# Patient Record
(demographics unavailable — no encounter records)

---

## 2024-11-20 NOTE — REVIEW OF SYSTEMS
[SOB] : shortness of breath [Dyspnea on exertion] : dyspnea during exertion [Cough] : cough [Negative] : Heme/Lymph [Leg Claudication] : no intermittent leg claudication [de-identified] : Tingling in her fingers and toes

## 2024-11-20 NOTE — REVIEW OF SYSTEMS
[SOB] : shortness of breath [Dyspnea on exertion] : dyspnea during exertion [Cough] : cough [Negative] : Heme/Lymph [Leg Claudication] : no intermittent leg claudication [de-identified] : Tingling in her fingers and toes

## 2024-11-20 NOTE — HISTORY OF PRESENT ILLNESS
[FreeTextEntry1] : Left chest pressure lasted all day today. She awoke with it. Had a rapid heart beat prior. these symptoms are consistent with when she lays down on her left side. Had a nasal polyp issue.  ERICK, But not using her CPAP machine. Still smoking, But wants to quit. 15/0.7 9.11.24 , hdl 65 Review of her chest CT done for lung issues Did not report any coronary calcification. She has not been taking her statin for unclear reasons.  Prior: episodes of palpitations at night. Wakes her from sleep. feels suddenly and persists for minutes.  Has ERICK but not under treatment due to broken CPAP machine. Some brief chest pains. Still smoking. 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 aortic calcification.  Prior: still taking verapamil. Stopped famotidine due to chest pressure and hair loss. No further chest pains. Still smoking.  Prior: She underwent exercise stress with her nuclear imaging which revealed poor exercise capacity with some shortness of breath but no ischemic ECG changes or myocardial perfusion image defect.  Her ejection fraction was preserved. She has continued to smoke and has not started her amlodipine yet. She took Crestor briefly but reported joint aches and stopped it after speaking with her internist.  Prior: Dear Taran, Thank you for referring her for cardiovascular evaluation and second opinion. She is a 69-year-old longtime smoker with hypertension and recently diagnosed hyperlipidemia who was seen by another cardiologist because of an abnormal ECG. She was treated for COVID and reported feeling ill for over 3 weeks, she has episodes of left upper chest discomfort that seem to resolve with PPI and belching. She reports that she walks up to 2 miles a day without chest pain, but at a very slow pace.  She notes exertional shortness of breath after 1 flight of stairs without chest pain. She has a history of COPD and diverticulitis with significant gastrointestinal bleed.  She is followed by Dr. Magdaleno for her pulmonary disease.  Recent PFT showed mild obstructive disease with reduced diffusion capacity. Recent lab work was unremarkable save for an LDL of 134 mg/dL for which she was started on Crestor 5 mg (she never took it). She has no known history of coronary artery disease, diabetes mellitus or family history of premature coronary artery disease. She does have obstructive sleep apnea that is not treated and fatigue is a prominent symptom as well.

## 2024-11-20 NOTE — PHYSICAL EXAM
[Normal Venous Pressure] : normal venous pressure [Normal S1, S2] : normal S1, S2 [No Murmur] : no murmur [Soft] : abdomen soft [Normal Gait] : normal gait [No Edema] : no edema [No Rash] : no rash [Moves all extremities] : moves all extremities [Alert and Oriented] : alert and oriented [de-identified] : Thin woman in no acute distress [de-identified] : [place] sclera [de-identified] : No JVD. [de-identified] : Increased expiratory time.  No wheezing. [de-identified] : Somewhat anxious affect

## 2024-11-20 NOTE — PHYSICAL EXAM
[Normal Venous Pressure] : normal venous pressure [Normal S1, S2] : normal S1, S2 [No Murmur] : no murmur [Soft] : abdomen soft [Normal Gait] : normal gait [No Edema] : no edema [No Rash] : no rash [Moves all extremities] : moves all extremities [Alert and Oriented] : alert and oriented [de-identified] : Thin woman in no acute distress [de-identified] : [place] sclera [de-identified] : No JVD. [de-identified] : Increased expiratory time.  No wheezing. [de-identified] : Somewhat anxious affect

## 2024-11-20 NOTE — DISCUSSION/SUMMARY
[FreeTextEntry1] : She is a 71-year-old with hypertension, hyperlipidemia, active smoking and an abnormal ECG and Atypical chest pains. ECG shows sinus rhythm with Nonspecific T wave flattening throughout the precordium that is similar to prior. Previous Echocardiography showed normal left ventricular systolic function. Andnuclear stress test showed no evidence of myocardial perfusion abnormalities.  Normal ejection fraction.   Chest pain: Atypical but no sign of coronary calcium on prior CT.  Coronary CT angiography would exclude severe atherosclerosis as the source of her symptoms. In addition I encouraged her to begin routine statin therapy with Crestor 10 mg daily. Smoking cessation: We discussed the benefits of smoking cessation and she is interested in stopping.  I suggested nicotine replacement therapy with patches and/or gum.  She reports that Chantix did not work for her in the past. We discussed this for 4 minutes and 17 seconds. Further recommendations after reviewing her CTCA. [EKG obtained to assist in diagnosis and management of assessed problem(s)] : EKG obtained to assist in diagnosis and management of assessed problem(s)

## 2024-12-18 NOTE — END OF VISIT
[FreeTextEntry3] : I personally saw and examined Ms. TOBY HAMMOND in detail this visit today. I personally reviewed the HPI, PMH, FH, SH, ROS and medications/allergies. I have spoken to BARBARA Levine regarding the history and have personally determined the assessment and plan of care, and documented this myself. I was present and participated in all key portions of the encounter and all procedures noted above. I have made changes in the body of the note where appropriate.   Attesting Faculty: See Attending Signature Below 
rash over buttocks

## 2024-12-18 NOTE — PROCEDURE
[de-identified] : Dr. Nava [de-identified] : Reason for nasal endoscopy: anterior rhinoscopy insufficient to account for symptoms.   Flexible scope #2 was used. Right nasal passage with normal inferior, middle and superior turbinates. Nasal passage patent with clear middle meatus and sphenoethmoid recess. Left nasal passage with normal inferior, middle and superior turbinates. Nasal passage was patent with clear middle meatus and sphenoethmoid recess. No mucopurulence or polyps appreciated. Nasopharynx clear

## 2024-12-18 NOTE — CONSULT LETTER
[Dear  ___] : Dear  [unfilled], [Consult Letter:] : I had the pleasure of evaluating your patient, [unfilled]. [Please see my note below.] : Please see my note below. [Consult Closing:] : Thank you very much for allowing me to participate in the care of this patient.  If you have any questions, please do not hesitate to contact me. [Sincerely,] : Sincerely, [FreeTextEntry3] : Leila Nava MD Otolaryngology and Cranial Base Surgery  Attending Physician- Department of Otolaryngology and Head & Neck Surgery  [facility] -[employer] [facility] Office: [phone]  Fax: [phone]

## 2024-12-18 NOTE — HISTORY OF PRESENT ILLNESS
[de-identified] : 70y/o female w/ connective tissue disease who came in with sinus issues. She has been having sinus pressure and pain in her cheek sinus and behind her eyes. She also has been having pain on the right side of her neck and head. She states she has been feeling off balance lately. Her right tooth has been bothering her recently, but she has not followed up with the dentist. She saw the dentist a while ago and they said she had a cyst in her right cheek sinus. Pt denies any nasal drainage, PND, runny nose or sinus infections

## 2024-12-18 NOTE — ASSESSMENT
[FreeTextEntry1] : 72y/o female w/ connective tissue disease who came in with sinus pressure and pain in her cheek sinus and behind her eyes and pain on the right side of her neck and head. She was recently on a course of amoxicillin and steroids but is still having symptoms on the right side  Nasal Endoscopy shows no significant findings, no signs of any sinus infection  - MRI head from 2013 reviewed and she was noted to have a right max floor cyst/polyp so suspect this is an incidental finding however given the severity of symptoms and the failure of doxy/prednisone will image sinuses to be sure - CT sinus ordered-will call with results  - may proceed with augmentin/medrol from her PCP if she feels she cannot wait for the imaging to eval further -F/u with dentist for the right tooth pain

## 2025-01-29 NOTE — PHYSICAL EXAM
[FreeTextEntry1] : Constitutional:  Patient was well-developed, well-nourished and in no acute distress.   Head:  Normocephalic, atraumatic. Tympanic membranes were clear.   Neck:  Supple with full range of motion.   Cardiovascular:  Cardiac rhythm was regular without murmur. There were no carotid bruits. Peripheral pulses were full and symmetric.   Respiratory:  Lungs were clear.   Abdomen:  Soft and nontender.   Spine:  Nontender.   Skin:  There were no rashes.   NEUROLOGICAL EXAMINATION:  Mental Status: Patient was alert and oriented. Speech was fluent. There was no dysarthria.   Cranial Nerves:   II: Visual acuity was 20/20-1 bilaterally with glasses at near card. Pupils were surgical but reactive. Visual fields were full. Funduscopic examination was normal.   III, IV, VI:  Eye movements were full without nystagmus.   V: Facial sensation was intact.   VII: Facial strength was normal.   VIII: Hearing was diminished on the right. Nylen Barany maneuver was negative.  IX, X: Palatal movement was normal. Phonation was normal.   XI: Sternocleidomastoids and trapezii were normal.   XII: Tongue was midline and movements normal. There was no lingual atrophy or fasciculations.   Motor Examination: Muscle bulk, tone and strength were normal.   Sensory Examination: Pinprick, vibration and joint position sense were intact.   Reflexes: DTRs were 2 throughout.   Plantar Responses: Plantar responses were flexor.   Coordination/Cerebellar Function: There was no dysmetria on finger to nose or heel to shin testing.   Gait/Stance: Gait was normal.  Tandem was mildly unsteady.  She swayed on Romberg testing.

## 2025-01-29 NOTE — CONSULT LETTER
[Dear  ___] : Dear  [unfilled], [Consult Letter:] : I had the pleasure of evaluating your patient, [unfilled]. [Please see my note below.] : Please see my note below. [Consult Closing:] : Thank you very much for allowing me to participate in the care of this patient.  If you have any questions, please do not hesitate to contact me. [Sincerely,] : Sincerely, [DrJenna  ___] : Dr. BENDER [DrJenna ___] : Dr. BENDER [FreeTextEntry3] : Yohan Leyva M.D.

## 2025-01-29 NOTE — ASSESSMENT
[FreeTextEntry1] : Mrs. Tuttle is a 71-year-old who presents with subacute global headaches and neck discomfort, ataxia, word retrieval difficulties, recurrent vertigo, nausea and vomiting.  She has undergone an extensive evaluation which revealed bilateral subdural enhancing collections.  CSF analysis was unremarkable with an opening pressure of 10 cm of water.  Imaging of the chest, abdomen and pelvis did not reveal malignancy.  I would like to review her cerebral imaging studies.  In the absence of infection or malignancy, I question whether her bilateral subdural collections might be the consequence of a CSF leak.  Further management will depend upon review of her studies and her clinical course.  I will keep you informed of her status.

## 2025-01-29 NOTE — HISTORY OF PRESENT ILLNESS
[FreeTextEntry1] : Mrs. Delisa Tuttle is a 71-year-old right-handed patient who was referred for neurologic evaluation at your kind suggestion.  Mrs. Tuttle suffers from hypertension, hyperlipidemia, COPD, kidney stones, diverticulosis, GERD, vitiligo and mixed connective tissue disease.  She was in her usual state of health until sometime in November 2024 when she began experiencing headaches.  She described tension in her neck radiating into her temples.  Her headaches were not particularly postural.  She was hospitalized at [facility] on December 19 when she developed acute vertigo accompanied by nausea and vomiting.  CT of the brain revealed probable chronic ischemic changes.  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  There was probable exostosis of the left occipital bone.  MRIs of the brain was performed on December 19 and 20 and revealed thin subdural hematomas along the cerebral convexities measuring 1 to 2 mm in greatest diameter without significant mass effect or midline shift.  There was moderate chronic microvascular changes and parenchymal volume loss.  There was no mention of enhancement.  MRA of the brain revealed no large vessel occlusion or aneurysm.  MRA of the neck revealed no significant vascular stenosis of the extracranial carotid or vertebral arteries.  She was discharged.  She experienced persistent headaches, nausea, vomiting and gait unsteadiness.  This [facility].  An MRI of the brain performed on January 14 revealed prominent leptomeningeal thickening up to 2 mm with enhancement.  There were mild to moderate white matter changes.  MRV and A were unremarkable.  CSF analysis performed on January 22 revealed an opening pressure of 10 cm of water.  There was 1 white cell and 9 red cells.  The fluid was clear and colorless.  CSF protein was 51.  Gram stain and bacterial cultures were negative.  IgG index was 0.71.  Oligoclonal bands were negative.  CSF cytology was negative.  CT of the chest revealed bronchiolitis and an enlarged thyroid with subcutaneous nodules.  There was moderate intrahepatic biliary dilatation.  There was soft tissue along the medial wall of the duodenum.  There was a long segment of colonic wall thickening.  An upper endoscopy was performed.  Biopsy revealed intestinal metaplasia and inactive gastritis.  Echocardiogram was normal.  She reports mild right nasal discharge only today.  She has persistent neck and global headaches which are not positional.  She feels mildly lightheaded.  She complains of mild word finding difficulties.  She denies visual, hearing, swallowing or sphincteric difficulties or sensory loss.  Past surgical history is notable for ROBERT/BSO for benign ovarian lesion, partial colectomy for diverticular disease, cataract extractions, right ankle repair and bilateral knee arthroscopic procedures.  She suffers from hypertension, hyperlipidemia, COPD, kidney stones, diverticulosis, GERD, vitiligo and mixed connective tissue disease.  There is no history of diabetes, cardiac, hepatic, thyroid, cerebrovascular or hematologic disease.  She has an allergy to erythromycin.  Medications include amitriptyline, amlodipine, atorvastatin, carvedilol, magnesium oxide and nicotine patch.  She is a heavy smoker and social drinker.  She is  and unemployed.  Family history is notable for a mother with COPD and heart disease.  She has a daughter who is overweight.  She has no siblings.  No

## 2025-03-03 NOTE — PHYSICAL EXAM
[FreeTextEntry1] : Constitutional:  Patient was well-developed, well-nourished and in no acute distress.   Head:  Normocephalic, atraumatic. Tympanic membranes were clear. Temporal artery pulses were full.  There was no scalp tenderness.  Neck:  Supple with full range of motion.   Cardiovascular:  Cardiac rhythm was regular without murmur. There were no carotid bruits. Peripheral pulses were full and symmetric.   Respiratory:  Lungs were clear.   Abdomen:  Soft and nontender.   Spine:  Nontender.   Skin:  There were no rashes.   NEUROLOGICAL EXAMINATION:  Mental Status: Patient was alert and oriented. Speech was fluent. There was no dysarthria.   Cranial Nerves:   II: She could finger count bilaterally.   Pupils were surgical but reactive. Visual fields were full. Funduscopic examination was normal.   III, IV, VI:  Eye movements were full without nystagmus.   V: Facial sensation was intact.   VII: Facial strength was normal.   VIII: Hearing was diminished on the right. Nylen Barany maneuver was negative.  IX, X: Palatal movement was normal. Phonation was normal.   XI: Sternocleidomastoids and trapezii were normal.   XII: Tongue was midline and movements normal. There was no lingual atrophy or fasciculations.   Motor Examination: Muscle bulk, tone and strength were normal.   Sensory Examination: Pinprick, vibration and joint position sense were intact.   Reflexes: DTRs were 2 throughout.   Plantar Responses: Plantar responses were flexor.   Coordination/Cerebellar Function: There was no dysmetria on finger to nose or heel to shin testing.   Gait/Stance: Gait was normal.  Tandem was mildly unsteady.  She swayed on Romberg testing.

## 2025-03-03 NOTE — CONSULT LETTER
Problem: Angina/Chest Pain  Goal: # Achieves Chest Pain Control (Pain Score = 0-1, no episodes)  Description: Chest pain control = Pain Score = 0-1, no episodes of pain  Outcome: Outcome Met, Complete Goal  Goal: Anxiety is controlled  Outcome: Outcome Met, Complete Goal  Goal: # Verbalizes understanding of symptoms, diagnosis, and treatment  Description: Document on Patient Education Activity  Outcome: Outcome Met, Complete Goal     Problem: ACS/AMI  Goal: Achieves chest pain control (Pain Score = 0 - 1, no episodes)  Description: Chest pain control = Pain Score = 0-1, no episodes of pain  Outcome: Outcome Met, Complete Goal  Goal: Anxiety is controlled  Outcome: Outcome Met, Complete Goal  Goal: Hemodynamic stability achieved/maintained  Description: AHA guidelines: Keep BP greater than 90 mm Hg.  Monitor for new or unstable rhythm changes  Outcome: Outcome Met, Complete Goal  Goal: Tolerates activity without s/s of intolerance  Outcome: Outcome Met, Complete Goal  Goal: Verbalizes understanding of rhythm disturbance, treatment procedure and pre, post-, and d/c care specific to intervention  Description: Document on Patient Education Activity  Outcome: Outcome Met, Complete Goal     Problem: Pain  Goal: #Acceptable pain level achieved/maintained at rest using NRS/Faces  Description: This goal is used for patients who can self-report.  Acceptable means the level is at or below the identified comfort/function goal.  Outcome: Outcome Met, Complete Goal  Goal: # Acceptable pain level achieved/maintained at rest using NRS/Faces without oversedation (opioid naive or PCA/Epidural infusion)  Description: This goal is used if Opioid-naïve or on PCA/Epidural Infusion.  Outcome: Outcome Met, Complete Goal  Goal: # Acceptable pain level achieved/maintained with activity using NRS/Faces  Description: This goal is used for patients who can self-report and are not achieving acceptable pain control during activity.  Outcome:  Outcome Met, Complete Goal  Goal: Acceptable pain/comfort level is achieved/maintained at rest (based on Pain Behaviors Scale)  Description: This goal is used for patients who are not able to self-report pain and are assessed for pain using the Pain Behaviors Scale  Outcome: Outcome Met, Complete Goal      [Dear  ___] : Dear  [unfilled], [Consult Letter:] : I had the pleasure of evaluating your patient, [unfilled]. [Please see my note below.] : Please see my note below. [Consult Closing:] : Thank you very much for allowing me to participate in the care of this patient.  If you have any questions, please do not hesitate to contact me. [Sincerely,] : Sincerely, [DrJenna  ___] : Dr. BENDER [FreeTextEntry3] : Yohan Leyva M.D. [DrJenna ___] : Dr. BENDER

## 2025-03-03 NOTE — HISTORY OF PRESENT ILLNESS
[FreeTextEntry1] : Mrs. Delisa Tuttle returned to the office having been initially evaluated on January 29, 2025.  She is a 71-year-old right-handed patient who suffers from hypertension, hyperlipidemia, COPD, kidney stones, diverticulosis, GERD, vitiligo and mixed connective tissue disease.  She was in her usual state of health until sometime in November 2024 when she began experiencing headaches.  She described tension in her neck radiating into her temples.  Her headaches were not particularly postural.  She was hospitalized at [facility] on December 19 when she developed acute vertigo accompanied by nausea and vomiting.  CT of the brain revealed probable chronic ischemic changes.  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  There was probable exostosis of the left occipital bone.  MRIs of the brain was performed on December 19 and 20 and revealed thin subdural hematomas along the cerebral convexities measuring 1 to 2 mm in greatest diameter without significant mass effect or midline shift.  There was moderate chronic microvascular changes and parenchymal volume loss.  There was no mention of enhancement.  MRA of the brain revealed no large vessel occlusion or aneurysm.  MRA of the neck revealed no significant vascular stenosis of the extracranial carotid or vertebral arteries.  She was discharged.  She experienced persistent headaches, nausea, vomiting and gait unsteadiness.  This prompted admission to [facility] in mid January 2025.  An MRI of the brain performed on January 14 revealed prominent leptomeningeal thickening up to 2 mm with enhancement.  There were mild to moderate white matter changes.  MRV and A were unremarkable.  CSF analysis performed on January 22 revealed an opening pressure of 10 cm of water.  There was 1 white cell and 9 red cells.  The fluid was clear and colorless.  CSF protein was 51.  Gram stain and bacterial cultures were negative.  IgG index was 0.71.  Oligoclonal bands were negative.  CSF cytology was negative.  CT of the chest revealed bronchiolitis and an enlarged thyroid with subcutaneous nodules.  There was moderate intrahepatic biliary dilatation.  There was soft tissue along the medial wall of the duodenum.  There was a long segment of colonic wall thickening.  An upper endoscopy was performed.  Biopsy revealed intestinal metaplasia and inactive gastritis.  Echocardiogram was normal.  She reported mild right nasal discharge only other day of initial consultation.  She had persistent neck and global headaches which were not positional.  She felt mildly lightheaded.  She complained of mild word finding difficulties.  She denied visual, hearing, swallowing or sphincteric difficulties or sensory loss.  I was concerned that her symptoms might represent intracranial hypotension.  This was supported by the opening pressure of 10 cm of water.  I suggested that she undergo an MR myelogram.  That study revealed no evidence of CSF leak.  She underwent a follow-up MRI of the brain on February 19, 2025.  That study revealed no evidence of intracranial hemorrhage.  There was moderate microvascular ischemic change.  She continues to complain of nausea, dizziness, bitemporal headache the latter worse when sitting and better when supine.  She complains of a stuffy nose. She has been taking Zofran and Pepto-Bismol without improvement.  Medications include carvedilol 6.25 mg twice a day, magnesium oxide, Symbicort, albuterol, folic acid, vitamin B6, vitamin B12.  She is no longer taking amitriptyline, amlodipine, metoprolol or atorvastatin.  Past surgical history is notable for ROBERT/BSO for benign ovarian lesion, partial colectomy for diverticular disease, cataract extractions, right ankle repair and bilateral knee arthroscopic procedures.  She suffers from hypertension, hyperlipidemia, COPD, kidney stones, diverticulosis, GERD, vitiligo and mixed connective tissue disease.  There is no history of diabetes, cardiac, hepatic, thyroid, cerebrovascular or hematologic disease.  She has an allergy to erythromycin.  She is a heavy smoker and social drinker.  She is  and unemployed.  Family history is notable for a mother with COPD and heart disease.  She has a daughter who is overweight.  She has no siblings.

## 2025-03-03 NOTE — ASSESSMENT
[FreeTextEntry1] : Mrs. Tuttle is a 71-year-old who presents with subacute global headaches and neck discomfort, ataxia, word retrieval difficulties, recurrent vertigo, nausea and vomiting.  Her symptoms persist despite resolution of the dural changes on MRI.  I still am suspicious that her symptoms are due to intracranial hypotension.  I cannot exclude an  occult process.  I suggested another MRI of the brain with and without contrast to exclude reappearance of radiographic findings of intracranial hypotension or other meningeal process.  I will also obtain additional blood tests including inflammatory markers.  I suggested follow-up with her gastroenterologist, internist and cardiologist.  If her diagnosis remains uncertain, oncologic FDG PET scan should be considered. Further management will depend upon her clinical course.

## 2025-03-10 NOTE — HISTORY OF PRESENT ILLNESS
[FreeTextEntry1] : She was taken to the hospital and diagnosed with a subdural hematoma that was spontaneous, no falls are reported.  There was a an extensive workup done in the near [facility] that showed normal arterial perfusion to her head and chronic white matter disease. She continues to be lightheaded particularly when she gets up and significantly nauseated.  Her blood pressure control has been variable and she was taken off verapamil and started on carvedilol but her nausea seems worse. She continues to smoke cigarettes. I reviewed her hospital stay on her phone. Prior: Left chest pressure lasted all day today. She awoke with it. Had a rapid heart beat prior. these symptoms are consistent with when she lays down on her left side. Had a nasal polyp issue.  ERICK, But not using her CPAP machine. Still smoking, But wants to quit. 15/0.7 9.11.24 , hdl 65 Review of her chest CT done for lung issues Did not report any coronary calcification. She has not been taking her statin for unclear reasons.  Prior: episodes of palpitations at night. Wakes her from sleep. feels suddenly and persists for minutes.  Has ERICK but not under treatment due to broken CPAP machine. Some brief chest pains. Still smoking. 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 aortic calcification.  Prior: still taking verapamil. Stopped famotidine due to chest pressure and hair loss. No further chest pains. Still smoking.  Prior: She underwent exercise stress with her nuclear imaging which revealed poor exercise capacity with some shortness of breath but no ischemic ECG changes or myocardial perfusion image defect.  Her ejection fraction was preserved. She has continued to smoke and has not started her amlodipine yet. She took Crestor briefly but reported joint aches and stopped it after speaking with her internist.  Prior: Dear Taran, Thank you for referring her for cardiovascular evaluation and second opinion. She is a 69-year-old longtime smoker with hypertension and recently diagnosed hyperlipidemia who was seen by another cardiologist because of an abnormal ECG. She was treated for COVID and reported feeling ill for over 3 weeks, she has episodes of left upper chest discomfort that seem to resolve with PPI and belching. She reports that she walks up to 2 miles a day without chest pain, but at a very slow pace.  She notes exertional shortness of breath after 1 flight of stairs without chest pain. She has a history of COPD and diverticulitis with significant gastrointestinal bleed.  She is followed by Dr. Magdaleno for her pulmonary disease.  Recent PFT showed mild obstructive disease with reduced diffusion capacity. Recent lab work was unremarkable save for an LDL of 134 mg/dL for which she was started on Crestor 5 mg (she never took it). She has no known history of coronary artery disease, diabetes mellitus or family history of premature coronary artery disease. She does have obstructive sleep apnea that is not treated and fatigue is a prominent symptom as well.

## 2025-03-10 NOTE — REVIEW OF SYSTEMS
[SOB] : shortness of breath [Dyspnea on exertion] : dyspnea during exertion [Cough] : cough [Negative] : Heme/Lymph [Leg Claudication] : no intermittent leg claudication [de-identified] : Tingling in her fingers and toes

## 2025-03-10 NOTE — PHYSICAL EXAM
[Normal Venous Pressure] : normal venous pressure [Normal S1, S2] : normal S1, S2 [No Murmur] : no murmur [Soft] : abdomen soft [Normal Gait] : normal gait [No Edema] : no edema [No Rash] : no rash [Moves all extremities] : moves all extremities [Alert and Oriented] : alert and oriented [de-identified] : Thin woman in no acute distress [de-identified] : [place] sclera [de-identified] : No JVD. [de-identified] : Increased expiratory time.  No wheezing. [de-identified] : Somewhat anxious affect

## 2025-03-10 NOTE — DISCUSSION/SUMMARY
[FreeTextEntry1] : She is a 71-year-old with hypertension, hyperlipidemia, active smoking and an abnormal ECG and Spontaneous subdural hematomas with chronic white matter disease and no signs of intracranial stenoses on MRA.  Persistent dizziness/vertigo and nausea. ECG shows sinus rhythm with Nonspecific T wave flattening throughout the precordium, similar to prior. Echocardiography At [facility] showed normal left ventricular systolic function. Chronic white matter disease/subdural hematomas: No sign of hemodynamic source for this though chronic hypertension can certainly predispose to white matter disease.  Toward that end I have suggested restarting verapamil and will uptitrate every 2 weeks as needed.  Discontinue carvedilol. Smoking cessation was discussed at length and will likely benefit her symptoms and underlying white matter disease as well. She is considering Wellbutrin but is concerned that will interfere with drug screening.  [EKG obtained to assist in diagnosis and management of assessed problem(s)] : EKG obtained to assist in diagnosis and management of assessed problem(s)

## 2025-03-25 NOTE — DISCUSSION/SUMMARY
[FreeTextEntry1] : She is a 71-year-old with hypertension, hyperlipidemia, active smoking and an abnormal ECG and Spontaneous subdural hematomas with chronic white matter disease and no signs of intracranial stenoses on MRA.    ECG shows sinus rhythm with Nonspecific T wave flattening throughout the precordium, similar to prior. Echocardiography At [facility] showed normal left ventricular systolic function. Chronic white matter disease/subdural hematomas: No sign of hemodynamic source for this though chronic hypertension can certainly predispose to white matter disease.  Continue low dose Verapamil. BP is adequately controlled. Smoking cessation was discussed at length and will likely benefit her symptoms and underlying white matter disease as well. She agrees with starting wellbutrin, Instructions given.  [EKG obtained to assist in diagnosis and management of assessed problem(s)] : EKG obtained to assist in diagnosis and management of assessed problem(s)

## 2025-03-25 NOTE — HISTORY OF PRESENT ILLNESS
[FreeTextEntry1] : Left arm numbness when she lays down. Not using CPAP. Wakes up from a pounding heart beat. Light sensitivity and chronic nausea.   Prior: She was taken to the hospital and diagnosed with a subdural hematoma that was spontaneous, no falls are reported.  There was a an extensive workup done in the near [facility] [place] that showed normal arterial perfusion to her head and chronic white matter disease. She continues to be lightheaded particularly when she gets up and significantly nauseated.  Her blood pressure control has been variable and she was taken off verapamil and started on carvedilol but her nausea seems worse. She continues to smoke cigarettes. I reviewed her hospital stay on her phone. Prior: Left chest pressure lasted all day today. She awoke with it. Had a rapid heart beat prior. these symptoms are consistent with when she lays down on her left side. Had a nasal polyp issue.  ERICK, But not using her CPAP machine. Still smoking, But wants to quit. 15/0.7 9.11.24 , hdl 65 Review of her chest CT done for lung issues Did not report any coronary calcification. She has not been taking her statin for unclear reasons.  Prior: episodes of palpitations at night. Wakes her from sleep. feels suddenly and persists for minutes.  Has ERICK but not under treatment due to broken CPAP machine. Some brief chest pains. Still smoking. 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 aortic calcification.  Prior: still taking verapamil. Stopped famotidine due to chest pressure and hair loss. No further chest pains. Still smoking.  Prior: She underwent exercise stress with her nuclear imaging which revealed poor exercise capacity with some shortness of breath but no ischemic ECG changes or myocardial perfusion image defect.  Her ejection fraction was preserved. She has continued to smoke and has not started her amlodipine yet. She took Crestor briefly but reported joint aches and stopped it after speaking with her internist.  Prior: Dear Taran, Thank you for referring her for cardiovascular evaluation and second opinion. She is a 69-year-old longtime smoker with hypertension and recently diagnosed hyperlipidemia who was seen by another cardiologist because of an abnormal ECG. She was treated for COVID and reported feeling ill for over 3 weeks, she has episodes of left upper chest discomfort that seem to resolve with PPI and belching. She reports that she walks up to 2 miles a day without chest pain, but at a very slow pace.  She notes exertional shortness of breath after 1 flight of stairs without chest pain. She has a history of COPD and diverticulitis with significant gastrointestinal bleed.  She is followed by Dr. Magdaleno for her pulmonary disease.  Recent PFT showed mild obstructive disease with reduced diffusion capacity. Recent lab work was unremarkable save for an LDL of 134 mg/dL for which she was started on Crestor 5 mg (she never took it). She has no known history of coronary artery disease, diabetes mellitus or family history of premature coronary artery disease. She does have obstructive sleep apnea that is not treated and fatigue is a prominent symptom as well.

## 2025-03-25 NOTE — PHYSICAL EXAM
[Normal Venous Pressure] : normal venous pressure [Normal S1, S2] : normal S1, S2 [No Murmur] : no murmur [Soft] : abdomen soft [Normal Gait] : normal gait [No Edema] : no edema [No Rash] : no rash [Moves all extremities] : moves all extremities [Alert and Oriented] : alert and oriented [de-identified] : Thin woman in no acute distress [de-identified] : [place] sclera [de-identified] : No JVD. [de-identified] : Increased expiratory time.  No wheezing. [de-identified] : Somewhat anxious affect

## 2025-03-25 NOTE — REVIEW OF SYSTEMS
[SOB] : shortness of breath [Dyspnea on exertion] : dyspnea during exertion [Leg Claudication] : no intermittent leg claudication [Cough] : cough [Negative] : Heme/Lymph [de-identified] : Tingling in her fingers and toes

## 2025-03-27 NOTE — ASSESSMENT
[FreeTextEntry1] : Mrs. Tuttle is a 71-year-old who presents with persistent symptoms with onset in November 2024 consisting of nausea, dizziness, tremulousness, imbalance, bitemporal headache and neck pain.  The only tangible findings were on MRI which revealed dural thickening and enhancement in December.  CSF opening pressure was 10 cm of water.  CSF was benign.  These findings are most consistent with intracranial hypotension.  In the absence of another yet to be diagnosed process, I suspect that her persistent symptoms are still the consequence of low-grade intracranial hypotension.  MR myelography did not reveal a leak.  I am attempting to have her February 19, 2025 and March 19, 2025 MRIs uploaded to our PACS system for review.  I will request neurosurgical and neuroradiology evaluations.  Diagnostic and treatment options include CT myelogram to assess for site of leakage versus empiric blood patch.  Further management will depend upon these results and her clinical course.  I suggested close telephone and office follow-up.

## 2025-03-27 NOTE — PHYSICAL EXAM
[FreeTextEntry1] : Constitutional:  Patient was well-developed, well-nourished and in no acute distress.   Head:  Normocephalic, atraumatic. Tympanic membranes were clear. Temporal artery pulses were full.  There was no scalp tenderness.  Neck:  Supple with full range of motion.   Cardiovascular:  Cardiac rhythm was regular without murmur. There were no carotid bruits. Peripheral pulses were full and symmetric.   Respiratory:  Lungs were clear.   Abdomen:  Soft and nontender.   Spine:  Nontender.   Skin:  There were no rashes.   NEUROLOGICAL EXAMINATION:  Mental Status: Patient was alert and oriented. Speech was fluent. There was no dysarthria.   Cranial Nerves:   II: Visual acuity was 20/20 bilaterally with glasses.   Pupils were surgical but reactive. Visual fields were full. Funduscopic examination was normal.   III, IV, VI:  Eye movements were full without nystagmus.   V: Facial sensation was intact.   VII: Facial strength was normal.   VIII: Hearing was diminished on the right.   IX, X: Palatal movement was normal. Phonation was normal.   XI: Sternocleidomastoids and trapezii were normal.   XII: Tongue was midline and movements normal. There was no lingual atrophy or fasciculations.   Motor Examination: Muscle bulk, tone and strength were normal.   Sensory Examination: Pinprick, vibration and joint position sense were intact.   Reflexes: DTRs were 2 throughout.   Plantar Responses: Plantar responses were flexor.   Coordination/Cerebellar Function: There was no dysmetria on finger to nose or heel to shin testing.   Gait/Stance: Gait was normal.  Tandem was mildly unsteady.  She swayed on Romberg testing.

## 2025-03-27 NOTE — HISTORY OF PRESENT ILLNESS
[FreeTextEntry1] : Mrs. Delisa Tuttle returned to the office having been initially evaluated on January 29, 2025.  She is a 71-year-old right-handed patient who suffers from hypertension, hyperlipidemia, COPD, kidney stones, diverticulosis, GERD, vitiligo and mixed connective tissue disease.  She was in her usual state of health until sometime in November 2024 when she began experiencing headaches.  She described tension in her neck radiating into her temples.  Her headaches were not particularly postural.  She was hospitalized at [facility] on December 19 when she developed acute vertigo accompanied by nausea and vomiting.  CT of the brain revealed probable chronic ischemic changes.  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  There was probable exostosis of the left occipital bone.  MRIs of the brain was performed on December 19 and 20 and revealed thin subdural hematomas along the cerebral convexities measuring 1 to 2 mm in greatest diameter without significant mass effect or midline shift.  There was moderate chronic microvascular changes and parenchymal volume loss.  There was no mention of enhancement.  MRA of the brain revealed no large vessel occlusion or aneurysm.  MRA of the neck revealed no significant vascular stenosis of the extracranial carotid or vertebral arteries.  She was discharged.  She experienced persistent headaches, nausea, vomiting and gait unsteadiness.  This prompted admission to [facility] in mid January 2025.  An MRI of the brain performed on January 14 revealed prominent leptomeningeal thickening up to 2 mm with enhancement.  There were mild to moderate white matter changes.  MRV and A were unremarkable.  CSF analysis performed on January 22 revealed an opening pressure of 10 cm of water.  There was 1 white cell and 9 red cells.  The fluid was clear and colorless.  CSF protein was 51.  Gram stain and bacterial cultures were negative.  IgG index was 0.71.  Oligoclonal bands were negative.  CSF cytology was negative.  CT of the chest revealed bronchiolitis and an enlarged thyroid with subcutaneous nodules.  There was moderate intrahepatic biliary dilatation.  There was soft tissue along the medial wall of the duodenum.  There was a long segment of colonic wall thickening.  An upper endoscopy was performed.  Biopsy revealed intestinal metaplasia and inactive gastritis.  Echocardiogram was normal.  She reported mild right nasal discharge only other day of initial consultation.  She had persistent neck and global headaches which were not positional.  She felt mildly lightheaded.  She complained of mild word finding difficulties.  She denied visual, hearing, swallowing or sphincteric difficulties or sensory loss.  I was concerned that her symptoms might represent intracranial hypotension.  This was supported by the opening pressure of 10 cm of water.  I suggested that she undergo an MR myelogram.  That study revealed no evidence of CSF leak.  She underwent a follow-up MRI of the brain on February 19, 2025.  That study revealed no evidence of intracranial hemorrhage.  There was moderate microvascular ischemic change.  At her March 3, 2025 visit, she complained of nausea, dizziness, bitemporal headache the latter worse when sitting and better when supine.  She complained of a stuffy nose. She had been taking Zofran and Pepto-Bismol without improvement.  Medications include carvedilol 6.25 mg twice a day, magnesium oxide, Symbicort, albuterol, folic acid, vitamin B6, vitamin B12.  She was no longer taking amitriptyline, amlodipine, metoprolol or atorvastatin.  I suggested another MRI of the brain to exclude reappearance of radiographic findings of intracranial hypotension or other meningeal process.  That study was done on March 19 and was unrevealing.  She complains of persistent lightheadedness, imbalance, tremulousness, photophobia and fluctuating nausea.  She complains of bitemporal pressure headaches which are worse when standing.  She has occasional neck pressure.  She complains of nocturnal hand tingling which resolves with movement.  Medications include verapamil 180 mg daily, Dulera, albuterol and meclizine as needed.  Past surgical history is notable for ROBERT/BSO for benign ovarian lesion, partial colectomy for diverticular disease, cataract extractions, right ankle repair and bilateral knee arthroscopic procedures.  She suffers from hypertension, hyperlipidemia, COPD, kidney stones, diverticulosis, GERD, vitiligo and mixed connective tissue disease.  There is no history of diabetes, cardiac, hepatic, thyroid, cerebrovascular or hematologic disease.  She has an allergy to erythromycin.  She is a heavy smoker and social drinker.  She is  and unemployed.  Family history is notable for a mother with COPD and heart disease.  She has a daughter who is overweight.  She has no siblings.

## 2025-03-27 NOTE — HISTORY OF PRESENT ILLNESS
[FreeTextEntry1] : Mrs. Delisa Tuttle returned to the office having been initially evaluated on January 29, 2025.  She is a 71-year-old right-handed patient who suffers from hypertension, hyperlipidemia, COPD, kidney stones, diverticulosis, GERD, vitiligo and mixed connective tissue disease.  She was in her usual state of health until sometime in November 2024 when she began experiencing headaches.  She described tension in her neck radiating into her temples.  Her headaches were not particularly postural.  She was hospitalized at [facility] on December 19 when she developed acute vertigo accompanied by nausea and vomiting.  CT of the brain revealed probable chronic ischemic changes.  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  There was probable exostosis of the left occipital bone.  MRIs of the brain was performed on December 19 and 20 and revealed thin subdural hematomas along the cerebral convexities measuring 1 to 2 mm in greatest diameter without significant mass effect or midline shift.  There was moderate chronic microvascular changes and parenchymal volume loss.  There was no mention of enhancement.  MRA of the brain revealed no large vessel occlusion or aneurysm.  MRA of the neck revealed no significant vascular stenosis of the extracranial carotid or vertebral arteries.  She was discharged.  She experienced persistent headaches, nausea, vomiting and gait unsteadiness.  This prompted admission to [facility] in mid January 2025.  An MRI of the brain performed on January 14 revealed prominent leptomeningeal thickening up to 2 mm with enhancement.  There were mild to moderate white matter changes.  MRV and A were unremarkable.  CSF analysis performed on January 22 revealed an opening pressure of 10 cm of water.  There was 1 white cell and 9 red cells.  The fluid was clear and colorless.  CSF protein was 51.  Gram stain and bacterial cultures were negative.  IgG index was 0.71.  Oligoclonal bands were negative.  CSF cytology was negative.  CT of the chest revealed bronchiolitis and an enlarged thyroid with subcutaneous nodules.  There was moderate intrahepatic biliary dilatation.  There was soft tissue along the medial wall of the duodenum.  There was a long segment of colonic wall thickening.  An upper endoscopy was performed.  Biopsy revealed intestinal metaplasia and inactive gastritis.  Echocardiogram was normal.  She reported mild right nasal discharge only other day of initial consultation.  She had persistent neck and global headaches which were not positional.  She felt mildly lightheaded.  She complained of mild word finding difficulties.  She denied visual, hearing, swallowing or sphincteric difficulties or sensory loss.  I was concerned that her symptoms might represent intracranial hypotension.  This was supported by the opening pressure of 10 cm of water.  I suggested that she undergo an MR myelogram.  That study revealed no evidence of CSF leak.  She underwent a follow-up MRI of the brain on February 19, 2025.  That study revealed no evidence of intracranial hemorrhage.  There was moderate microvascular ischemic change.  At her March 3, 2025 visit, she complained of nausea, dizziness, bitemporal headache the latter worse when sitting and better when supine.  She complained of a stuffy nose. She had been taking Zofran and Pepto-Bismol without improvement.  Medications include carvedilol 6.25 mg twice a day, magnesium oxide, Symbicort, albuterol, folic acid, vitamin B6, vitamin B12.  She was no longer taking amitriptyline, amlodipine, metoprolol or atorvastatin.  I suggested another MRI of the brain to exclude reappearance of radiographic findings of intracranial hypotension or other meningeal process.  That study was done on March 19 and was unrevealing.  She complains of persistent lightheadedness, imbalance, tremulousness, photophobia and fluctuating nausea.  She complains of bitemporal pressure headaches which are worse when standing.  She has occasional neck pressure.  She complains of nocturnal hand tingling which resolves with movement.  Medications include verapamil 180 mg daily, Dulera, albuterol and meclizine as needed.  Past surgical history is notable for ROBERT/BSO for benign ovarian lesion, partial colectomy for diverticular disease, cataract extractions, right ankle repair and bilateral knee arthroscopic procedures.  She suffers from hypertension, hyperlipidemia, COPD, kidney stones, diverticulosis, GERD, vitiligo and mixed connective tissue disease.  There is no history of diabetes, cardiac, hepatic, thyroid, cerebrovascular or hematologic disease.  She has an allergy to erythromycin.  She is a heavy smoker and social drinker.  She is  and unemployed.  Family history is notable for a mother with COPD and heart disease.  She has a daughter who is overweight.  She has no siblings.

## 2025-04-14 NOTE — PHYSICAL EXAM
[General Appearance - Alert] : alert [General Appearance - In No Acute Distress] : in no acute distress [Oriented To Time, Place, And Person] : oriented to person, place, and time [Impaired Insight] : insight and judgment were intact [Sclera] : the sclera and conjunctiva were normal [Hearing Threshold Finger Rub Not [place]] : hearing was normal [Neck Appearance] : the appearance of the neck was normal [] : no respiratory distress [Edema] : there was no peripheral edema [Abnormal Walk] : normal gait [Involuntary Movements] : no involuntary movements were seen [Skin Color & Pigmentation] : normal skin color and pigmentation [Person] : oriented to person [Place] : oriented to place [Time] : oriented to time [Short Term Intact] : short term memory intact [Remote Intact] : remote memory intact [Span Intact] : the attention span was normal [Concentration Intact] : normal concentrating ability [Fluency] : fluency intact [Comprehension] : comprehension intact [Current Events] : adequate knowledge of current events [Past History] : adequate knowledge of personal past history [Vocabulary] : adequate range of vocabulary [Cranial Nerves Optic (II)] : visual acuity intact bilaterally,  pupils equal round and reactive to light [Cranial Nerves Oculomotor (III)] : extraocular motion intact [Cranial Nerves Trigeminal (V)] : facial sensation intact symmetrically [Cranial Nerves Facial (VII)] : face symmetrical [Cranial Nerves Vestibulocochlear (VIII)] : hearing was intact bilaterally [Cranial Nerves Glossopharyngeal (IX)] : tongue and palate midline [Cranial Nerves Accessory (XI - Cranial And Spinal)] : head turning and shoulder shrug symmetric [Cranial Nerves Hypoglossal (XII)] : there was no tongue deviation with protrusion [Motor Tone] : muscle tone was normal in all four extremities [Motor Strength] : muscle strength was normal in all four extremities [No Muscle Atrophy] : normal bulk in all four extremities [Sensation Tactile Decrease] : light touch was intact [Past-pointing] : there was no past-pointing [Tremor] : no tremor present

## 2025-04-14 NOTE — PHYSICAL EXAM
[General Appearance - Alert] : alert [General Appearance - In No Acute Distress] : in no acute distress [Oriented To Time, Place, And Person] : oriented to person, place, and time [Impaired Insight] : insight and judgment were intact [Sclera] : the sclera and conjunctiva were normal [Hearing Threshold Finger Rub Not [place]] : hearing was normal [Neck Appearance] : the appearance of the neck was normal [] : no respiratory distress [Edema] : there was no peripheral edema [Abnormal Walk] : normal gait [Involuntary Movements] : no involuntary movements were seen [Skin Color & Pigmentation] : normal skin color and pigmentation [Person] : oriented to person [Place] : oriented to place [Time] : oriented to time [Short Term Intact] : short term memory intact [Remote Intact] : remote memory intact [Span Intact] : the attention span was normal [Concentration Intact] : normal concentrating ability [Fluency] : fluency intact [Comprehension] : comprehension intact [Current Events] : adequate knowledge of current events [Past History] : adequate knowledge of personal past history [Vocabulary] : adequate range of vocabulary [Cranial Nerves Optic (II)] : visual acuity intact bilaterally,  pupils equal round and reactive to light [Cranial Nerves Oculomotor (III)] : extraocular motion intact [Cranial Nerves Trigeminal (V)] : facial sensation intact symmetrically [Cranial Nerves Facial (VII)] : face symmetrical [Cranial Nerves Vestibulocochlear (VIII)] : hearing was intact bilaterally [Cranial Nerves Glossopharyngeal (IX)] : tongue and palate midline [Cranial Nerves Accessory (XI - Cranial And Spinal)] : head turning and shoulder shrug symmetric [Cranial Nerves Hypoglossal (XII)] : there was no tongue deviation with protrusion [Motor Tone] : muscle tone was normal in all four extremities [Motor Strength] : muscle strength was normal in all four extremities [No Muscle Atrophy] : normal bulk in all four extremities [Sensation Tactile Decrease] : light touch was intact [Past-pointing] : there was no past-pointing [Tremor] : no tremor present

## 2025-04-14 NOTE — ASSESSMENT
[FreeTextEntry1] : Impression: rule out CSF leak rule out IIH  discussed repeating MRI since quality at [facility] is not ideal.  Obtain an MRV to assess IIH discussed may then obtain a CT myelogram to assess for CSF leak if indicated   PLAN: MRI brain w/wo - asap  MRV head w/wo - asap  RTO 2 week TTM fine  71F here with symptoms and outside imaging findings concerning for intracranial hypotension. MRI done at [facility] based on report with an intermediate or high probability KATI score. Will obtain repeat MRI now and also plan for dynamic CT myelogram.

## 2025-04-14 NOTE — PHYSICAL EXAM
[General Appearance - Alert] : alert [General Appearance - In No Acute Distress] : in no acute distress [Oriented To Time, Place, And Person] : oriented to person, place, and time [Impaired Insight] : insight and judgment were intact [Sclera] : the sclera and conjunctiva were normal [Hearing Threshold Finger Rub Not [place]] : hearing was normal [Neck Appearance] : the appearance of the neck was normal [] : no respiratory distress [Edema] : there was no peripheral edema [Abnormal Walk] : normal gait [Involuntary Movements] : no involuntary movements were seen [Skin Color & Pigmentation] : normal skin color and pigmentation [Person] : oriented to person [Place] : oriented to place [Time] : oriented to time [Short Term Intact] : short term memory intact [Remote Intact] : remote memory intact [Span Intact] : the attention span was normal [Concentration Intact] : normal concentrating ability [Fluency] : fluency intact [Comprehension] : comprehension intact [Current Events] : adequate knowledge of current events [Past History] : adequate knowledge of personal past history [Vocabulary] : adequate range of vocabulary [Cranial Nerves Optic (II)] : visual acuity intact bilaterally,  pupils equal round and reactive to light [Cranial Nerves Oculomotor (III)] : extraocular motion intact [Cranial Nerves Trigeminal (V)] : facial sensation intact symmetrically [Cranial Nerves Facial (VII)] : face symmetrical [Cranial Nerves Vestibulocochlear (VIII)] : hearing was intact bilaterally [Cranial Nerves Glossopharyngeal (IX)] : tongue and palate midline [Cranial Nerves Accessory (XI - Cranial And Spinal)] : head turning and shoulder shrug symmetric [Cranial Nerves Hypoglossal (XII)] : there was no tongue deviation with protrusion [Motor Tone] : muscle tone was normal in all four extremities [Motor Strength] : muscle strength was normal in all four extremities [No Muscle Atrophy] : normal bulk in all four extremities [Sensation Tactile Decrease] : light touch was intact [Past-pointing] : there was no past-pointing [Tremor] : no tremor present

## 2025-04-14 NOTE — ASSESSMENT
[FreeTextEntry1] : Impression: rule out CSF leak rule out IIH  discussed repeating MRI since quality at [facility] is not ideal.  Obtain an MRV to assess IIH discussed may then obtain a CT myelogram to assess for CSF leak if indicated   PLAN: MRI brain w/wo - asap  MRV head w/wo - asap  RTO 2 week TTM fine  71F here with symptoms and outside imaging findings concerning for intracranial hypotension. MRI done at [facility] based on report with an intermediate or high probability KATI score. Will obtain repeat MRI now and also plan for dynamic CT myelogram.

## 2025-04-14 NOTE — HISTORY OF PRESENT ILLNESS
[de-identified] : 72 yo RHD female with PMH of HTN, HLD, COPD, kidney stones, diverticulosis, GERD, vitiligo and mixed connective tissue disease. Complains of headache onset 11/2024, now since 12/19/24, positional headaches worse in standing, relieved lying down following with Dr. Leyva for suspected IIH (hypotension). During work up at [facility] 12/2024, CT of the brain showed chronic ischemic changes, osteoma of the right frontal sinus with mild sinus mucosal thickening without air-fluid levels, exostosis of the left occipital bone. MRIs of the brain revealed thin subdural hematomas along the cerebral convexities measuring 1 to 2 mm in greatest diameter without significant mass effect or midline shift. There was moderate chronic microvascular changes and parenchymal volume loss. There was no mention of enhancement. MRA of the brain revealed no large vessel occlusion or aneurysm. MRA of the neck revealed no significant vascular stenosis of the extracranial carotid or vertebral arteries. She was discharged. [facility] work up for persistent headaches, nausea, vomiting and gait unsteadiness concerning for intracranial hypotension. This was supported by the opening pressure of 10 cm of water. MR myelogram did not show any evidence of CSF leak. She underwent a follow-up MRI of the brain on February 19, 2025. That study revealed no evidence of intracranial hemorrhage. There was moderate microvascular ischemic change.  Today, patient arrives for an initial consult reports all above history, and feels symptoms of intermittent headaches, Right ear tinnitus for years whooshing, unclear if positional headaches feels stable from sit to stand maybe slightly increased discomfort.  Still reports daily nausea taking pepto bismol, feels dizziness and imbalance slightly improved. Reviewed LP opening pressure of 10, [place] [facility] [place] Discussed work up for occult CSF leak - this would require a CT myelogram to assess if CSF leaking into the vein

## 2025-04-14 NOTE — REASON FOR VISIT
[Consultation] : a consultation visit [Referred By: _________] : Patient was referred by [facility] [FreeTextEntry1] : IIH (hypotension)

## 2025-04-14 NOTE — ASSESSMENT
[FreeTextEntry1] : Impression: rule out CSF leak rule out IIH  discussed repeating MRI since quality at [facility] is not ideal.  Obtain an MRV to assess IIH discussed may then obtain a CT myelogram to assess for CSF leak if indicated   PLAN: MRI brain w/wo - asap  MRV head w/wo - asap  RTO 2 week TTM fine  71F here with symptoms and outside imaging findings concerning for intracranial hypotension. MRI done at [facility] based on report with an intermediate or high probability KATI score. Will obtain repeat MRI now and also plan for dynamic CT myelogram.

## 2025-04-14 NOTE — HISTORY OF PRESENT ILLNESS
[de-identified] : 70 yo RHD female with PMH of HTN, HLD, COPD, kidney stones, diverticulosis, GERD, vitiligo and mixed connective tissue disease. Complains of headache onset 11/2024, now since 12/19/24, positional headaches worse in standing, relieved lying down following with Dr. Leyva for suspected IIH (hypotension). During work up at [facility] 12/2024, CT of the brain showed chronic ischemic changes, osteoma of the right frontal sinus with mild sinus mucosal thickening without air-fluid levels, exostosis of the left occipital bone. MRIs of the brain revealed thin subdural hematomas along the cerebral convexities measuring 1 to 2 mm in greatest diameter without significant mass effect or midline shift. There was moderate chronic microvascular changes and parenchymal volume loss. There was no mention of enhancement. MRA of the brain revealed no large vessel occlusion or aneurysm. MRA of the neck revealed no significant vascular stenosis of the extracranial carotid or vertebral arteries. She was discharged. [facility] work up for persistent headaches, nausea, vomiting and gait unsteadiness concerning for intracranial hypotension. This was supported by the opening pressure of 10 cm of water. MR myelogram did not show any evidence of CSF leak. She underwent a follow-up MRI of the brain on February 19, 2025. That study revealed no evidence of intracranial hemorrhage. There was moderate microvascular ischemic change.  Today, patient arrives for an initial consult reports all above history, and feels symptoms of intermittent headaches, Right ear tinnitus for years whooshing, unclear if positional headaches feels stable from sit to stand maybe slightly increased discomfort.  Still reports daily nausea taking pepto bismol, feels dizziness and imbalance slightly improved. Reviewed LP opening pressure of 10, last MRI's done at [facility] not the best quality Discussed work up for occult CSF leak - this would require a CT myelogram to assess if CSF leaking into the vein

## 2025-04-14 NOTE — HISTORY OF PRESENT ILLNESS
[de-identified] : 72 yo RHD female with PMH of HTN, HLD, COPD, kidney stones, diverticulosis, GERD, vitiligo and mixed connective tissue disease. Complains of headache onset 11/2024, now since 12/19/24, positional headaches worse in standing, relieved lying down following with Dr. Leyva for suspected IIH (hypotension). During work up at [facility] 12/2024, CT of the brain showed chronic ischemic changes, osteoma of the right frontal sinus with mild sinus mucosal thickening without air-fluid levels, exostosis of the left occipital bone. MRIs of the brain revealed thin subdural hematomas along the cerebral convexities measuring 1 to 2 mm in greatest diameter without significant mass effect or midline shift. There was moderate chronic microvascular changes and parenchymal volume loss. There was no mention of enhancement. MRA of the brain revealed no large vessel occlusion or aneurysm. MRA of the neck revealed no significant vascular stenosis of the extracranial carotid or vertebral arteries. She was discharged. [facility] work up for persistent headaches, nausea, vomiting and gait unsteadiness concerning for intracranial hypotension. This was supported by the opening pressure of 10 cm of water. MR myelogram did not show any evidence of CSF leak. She underwent a follow-up MRI of the brain on February 19, 2025. That study revealed no evidence of intracranial hemorrhage. There was moderate microvascular ischemic change.  Today, patient arrives for an initial consult reports all above history, and feels symptoms of intermittent headaches, Right ear tinnitus for years whooshing, unclear if positional headaches feels stable from sit to stand maybe slightly increased discomfort.  Still reports daily nausea taking pepto bismol, feels dizziness and imbalance slightly improved. Reviewed LP opening pressure of 10, last MRI's done at [facility] not the best quality Discussed work up for occult CSF leak - this would require a CT myelogram to assess if CSF leaking into the vein

## 2025-04-14 NOTE — REVIEW OF SYSTEMS
[Anxiety] : anxiety [As Noted in HPI] : as noted in HPI [Eyesight Problems] : no eyesight problems [Chest Pain] : no chest pain [Shortness Of Breath] : no shortness of breath [Vomiting] : no vomiting [Skin Lesions] : no skin lesions [Easy Bleeding] : no tendency for easy bleeding

## 2025-04-17 NOTE — REASON FOR VISIT
[Home] : at home, [unfilled] , at the time of the visit. [Medical Office: ([facility])___] : at the medical office located in  [Telephone (audio)] : This telephonic visit was provided via audio only technology. [Verbal consent obtained from patient] : the patient, [unfilled]

## 2025-04-17 NOTE — REASON FOR VISIT
[Home] : at home, [unfilled] , at the time of the visit. [Medical Office: ([facility])___] : at the medical office located in  [Telephone (audio)] : This telephonic visit was provided via audio only technology. [Verbal consent obtained from patient] : the patient, [unfilled]

## 2025-04-17 NOTE — REASON FOR VISIT
[Home] : at home, [unfilled] , at the time of the visit. [Medical Office: ([facility])___] : at the medical office located in  [Telephone (audio)] : This telephonic visit was provided via audio only technology. [Verbal consent obtained from patient] : the patient, [unfilled]

## 2025-04-25 NOTE — ASSESSMENT
[FreeTextEntry1] : Impression: rule out CSF leak rule out SIH  reviewed repeat MRI/MRV of 4/14/25 in pacs/nw which appears similar to previous images  discussed recommend a CT myelogram to definitively assess for CSF leak due to persistent symptoms.  Based on review of prior MRI report the patient would have had a probable positive KATI SIH score.    PLAN: CT myelogram Dynamic - see all orders - to be done with Dr. Streeter at [facility] CT Cervical/ Thoracic/ Lumbar with contast Xray Fluoro guided spine injection epidural - Dr. Streeter RTO 1 month or when above complete to assess for CSF leak  71F here with symptoms and outside imaging findings concerning for intracranial hypotension. MRI done at [facility] based on report with an intermediate or high probability KATI score.  Repeat MRI reviewed will now plan for dynamic CT myelogram.

## 2025-04-25 NOTE — HISTORY OF PRESENT ILLNESS
[FreeTextEntry1] : 70 yo RHD female with PMH of HTN, HLD, COPD, kidney stones, diverticulosis, GERD, vitiligo and mixed connective tissue disease. Complains of headache onset 11/2024, now since 12/19/24, positional headaches worse in standing, relieved lying down following with Dr. Leyva for suspected IIH (hypotension). During work up at [facility] 12/2024, CT of the brain showed chronic ischemic changes, osteoma of the right frontal sinus with mild sinus mucosal thickening without air-fluid levels, exostosis of the left occipital bone. MRIs of the brain revealed thin subdural hematomas along the cerebral convexities measuring 1 to 2 mm in greatest diameter without significant mass effect or midline shift. There was moderate chronic microvascular changes and parenchymal volume loss. There was no mention of enhancement. MRA of the brain revealed no large vessel occlusion or aneurysm. MRA of the neck revealed no significant vascular stenosis of the extracranial carotid or vertebral arteries. She was discharged.  [facility] work up for persistent headaches, nausea, vomiting and gait unsteadiness concerning for intracranial hypotension. This was supported by the opening pressure of 10 cm of water. MR myelogram did not show any evidence of CSF leak. She underwent a follow-up MRI of the brain on February 19, 2025. That study revealed no evidence of intracranial hemorrhage. There was moderate microvascular ischemic change.  Today, patient arrives via TEB for imaging review of MRI/MRV in pacs/nw.  Stable persistent chronic intermittent symptoms of headaches (unclear if improve or worsen with sitting or lying down), nausea, gait unsteadiness when initiating.   4/10/25: patient arrives for an initial consult reports all above history, and feels symptoms of intermittent headaches, Right ear tinnitus for years whooshing, unclear if positional headaches feels stable from sit to stand maybe slightly increased discomfort.  Still reports daily nausea taking pepto bismol, feels dizziness and imbalance slightly improved. Reviewed LP opening pressure of 10, last MRI's done at [facility] not the best quality Discussed work up for occult CSF leak - this would require a CT myelogram to assess if CSF leaking into the vein

## 2025-04-25 NOTE — HISTORY OF PRESENT ILLNESS
[FreeTextEntry1] : 70 yo RHD female with PMH of HTN, HLD, COPD, kidney stones, diverticulosis, GERD, vitiligo and mixed connective tissue disease. Complains of headache onset 11/2024, now since 12/19/24, positional headaches worse in standing, relieved lying down following with Dr. Leyva for suspected IIH (hypotension). During work up at [facility] 12/2024, CT of the brain showed chronic ischemic changes, osteoma of the right frontal sinus with mild sinus mucosal thickening without air-fluid levels, exostosis of the left occipital bone. MRIs of the brain revealed thin subdural hematomas along the cerebral convexities measuring 1 to 2 mm in greatest diameter without significant mass effect or midline shift. There was moderate chronic microvascular changes and parenchymal volume loss. There was no mention of enhancement. MRA of the brain revealed no large vessel occlusion or aneurysm. MRA of the neck revealed no significant vascular stenosis of the extracranial carotid or vertebral arteries. She was discharged.  [facility] work up for persistent headaches, nausea, vomiting and gait unsteadiness concerning for intracranial hypotension. This was supported by the opening pressure of 10 cm of water. MR myelogram did not show any evidence of CSF leak. She underwent a follow-up MRI of the brain on February 19, 2025. That study revealed no evidence of intracranial hemorrhage. There was moderate microvascular ischemic change.  Today, patient arrives via TEB for imaging review of MRI/MRV in pacs/nw.  Stable persistent chronic intermittent symptoms of headaches (unclear if improve or worsen with sitting or lying down), nausea, gait unsteadiness when initiating.   4/10/25: patient arrives for an initial consult reports all above history, and feels symptoms of intermittent headaches, Right ear tinnitus for years whooshing, unclear if positional headaches feels stable from sit to stand maybe slightly increased discomfort.  Still reports daily nausea taking pepto bismol, feels dizziness and imbalance slightly improved. Reviewed LP opening pressure of 10, last MRI's done at [facility] [place] the best quality Discussed work up for occult CSF leak - this would require a CT myelogram to assess if CSF leaking into the vein

## 2025-04-25 NOTE — RESULTS/DATA
[FreeTextEntry1] : EXAM: [id number] - MR BRAIN WAW IC  - ORDERED BY: DRAKE DESIR  EXAM: [id number] - MR VENOGRAM BRAIN WAW IC  - ORDERED BY: DRAKE DESIR  PROCEDURE DATE:  04/14/2025  INTERPRETATION:  .  CLINICAL INFORMATION: Evaluate for pathology. Intracranial hypotension, unspecified.  TECHNIQUE: Multiplanar multisequential MRI of the brain was acquired with and without the administration of IV gadolinium. An MR venogram study of the head was performed with time-of-flight and postcontrast technique. 6.5 cc's of IV Gadavist was administered for the purposes of this examination. 1 cc was discarded. 3D MIP reconstructed images were also performed and reviewed.  COMPARISON: Prior outside contrast enhanced brain MRI study from 3/19/2025. Prior outside brain MRI study from 2/19/2025. Prior outside head CT exam from 12/19/2024.  FINDINGS:  MRI BRAIN: Multiple similar-appearing patchy confluent nonspecific foci of T2/FLAIR hyperintensity are noted throughout the deep and periventricular white matter of the cerebral hemispheres. There is no associated mass effect. There is no evidence of acute ischemia on the diffusion-weighted images.  There is no abnormal brain parenchymal or leptomeningeal enhancement.  There is mild diffuse cerebral volume loss with prominence of the sulci, fissures, and cisternal spaces which is normal for the patient's age. Ventricular size and configuration is unremarkable. Flow-voids are noted throughout the major intracranial vessels, on the T2 weighted images, consistent with their patency. The posterior fossa appears unremarkable.  There is an unchanged partially empty sella.  Polyps versus retention cysts are seen within the bilateral maxillary sinuses. A right frontal sinus osteoma appears unchanged. The tympanomastoid cavities are clear. The calvarium is intact. There is an unchanged appearing exophytic left-sided occipital osteoma. There is evidence of bilateral cataract removal.  MRV HEAD: The superior, inferior, straight, confluence of, transverse, and sigmoid sinuses are patent. There is no evidence of venous sinus thrombosis or stenosis. Arachnoid granulations are seen within the right transverse sinus.  IMPRESSION:  MRI BRAIN: 1. No acute intracranial hemorrhage, acute ischemia, or abnormal intracranial enhancement. 2. Multiple similar-appearing patchy confluent nonspecific abnormal white matter foci of T2/FLAIR prolongation statistically favoring microvascular type changes. 3. Unchanged partially empty sella.  MRV HEAD: 1. No evidence of venous sinus thrombosis or stenosis.  --- End of Report ---  BAILEY GOMEZ MD; Attending Radiologist This document has been electronically signed. Apr 14 2025  8:22PM

## 2025-04-25 NOTE — RESULTS/DATA
[FreeTextEntry1] : EXAM: [id number] - MR BRAIN WAW IC  - ORDERED BY: DRAKE DESIR  EXAM: [id number] - MR VENOGRAM BRAIN WAW IC  - ORDERED BY: DRAKE DESIR  PROCEDURE DATE:  04/14/2025  INTERPRETATION:  .  CLINICAL INFORMATION: Evaluate for pathology. Intracranial hypotension, unspecified.  TECHNIQUE: Multiplanar multisequential MRI of the brain was acquired with and without the administration of IV gadolinium. An MR venogram study of the head was performed with time-of-flight and postcontrast technique. 6.5 cc's of IV Gadavist was administered for the purposes of this examination. 1 cc was discarded. 3D MIP reconstructed images were also performed and reviewed.  COMPARISON: Prior outside contrast enhanced brain MRI study from 3/19/2025. Prior outside brain MRI study from 2/19/2025. Prior outside head CT exam from 12/19/2024.  FINDINGS:  MRI BRAIN: Multiple similar-appearing patchy confluent nonspecific foci of T2/FLAIR hyperintensity are noted throughout the deep and periventricular white matter of the cerebral hemispheres. There is no associated mass effect. There is no evidence of acute ischemia on the diffusion-weighted images.  There is no abnormal brain parenchymal or leptomeningeal enhancement.  There is mild diffuse cerebral volume loss with prominence of the sulci, fissures, and cisternal spaces which is normal for the patient's age. Ventricular size and configuration is unremarkable. Flow-voids are noted throughout the major intracranial vessels, on the T2 weighted images, consistent with their patency. The posterior fossa appears unremarkable.  There is an unchanged partially empty sella.  Polyps versus retention cysts are seen within the bilateral maxillary sinuses. A right frontal sinus osteoma appears unchanged. The tympanomastoid cavities are clear. The calvarium is intact. There is an unchanged appearing exophytic left-sided occipital osteoma. There is evidence of bilateral cataract removal.  MRV HEAD: The superior, inferior, straight, confluence of, transverse, and sigmoid sinuses are patent. There is no evidence of venous sinus thrombosis or stenosis. Arachnoid granulations are seen within the right transverse sinus.  IMPRESSION:  MRI BRAIN: 1. No acute intracranial hemorrhage, acute ischemia, or abnormal intracranial enhancement. 2. Multiple similar-appearing patchy confluent nonspecific abnormal white matter foci of T2/FLAIR prolongation statistically favoring microvascular type changes. 3. Unchanged partially empty sella.  MRV HEAD: 1. No evidence of venous sinus thrombosis or stenosis.  --- End of Report ---  BAILEY GOMEZ MD; Attending Radiologist This document has been electronically signed. Apr 14 2025  8:22PM

## 2025-04-25 NOTE — ASSESSMENT
[FreeTextEntry1] : Impression: rule out CSF leak rule out SIH  reviewed repeat MRI/MRV of 4/14/25 in pacs/nw which appears similar to previous images  discussed recommend a CT myelogram to definitively assess for CSF leak due to persistent symptoms.  Based on review of prior MRI report the patient would have had a probable positive KATI SIH score.    PLAN: CT myelogram Dynamic - see all orders - to be done with Dr. Streeter at [facility] CT Cervical/ Thoracic/ Lumbar with contast Xray Fluoro guided spine injection epidural - Dr. Streeter RTO 1 month or when above complete to assess for CSF leak  71F here with symptoms and outside imaging findings concerning for intracranial hypotension. MRI done at [facility] based on report with an intermediate or high probability KATI score.  Repeat MRI reviewed will now plan for dynamic CT myelogram.

## 2025-04-25 NOTE — RESULTS/DATA
[FreeTextEntry1] : EXAM: [id number] - MR BRAIN WAW IC  - ORDERED BY: DRAKE DESIR  EXAM: [id number] - MR VENOGRAM BRAIN WAW IC  - ORDERED BY: DRAKE DESIR  PROCEDURE DATE:  04/14/2025  INTERPRETATION:  .  CLINICAL INFORMATION: Evaluate for pathology. Intracranial hypotension, unspecified.  TECHNIQUE: Multiplanar multisequential MRI of the brain was acquired with and without the administration of IV gadolinium. An MR venogram study of the head was performed with time-of-flight and postcontrast technique. 6.5 cc's of IV Gadavist was administered for the purposes of this examination. 1 cc was discarded. 3D MIP reconstructed images were also performed and reviewed.  COMPARISON: Prior outside contrast enhanced brain MRI study from 3/19/2025. Prior outside brain MRI study from 2/19/2025. Prior outside head CT exam from 12/19/2024.  FINDINGS:  MRI BRAIN: Multiple similar-appearing patchy confluent nonspecific foci of T2/FLAIR hyperintensity are noted throughout the deep and periventricular white matter of the cerebral hemispheres. There is no associated mass effect. There is no evidence of acute ischemia on the diffusion-weighted images.  There is no abnormal brain parenchymal or leptomeningeal enhancement.  There is mild diffuse cerebral volume loss with prominence of the sulci, fissures, and cisternal spaces which is normal for the patient's age. Ventricular size and configuration is unremarkable. Flow-voids are noted throughout the major intracranial vessels, on the T2 weighted images, consistent with their patency. The posterior fossa appears unremarkable.  There is an unchanged partially empty sella.  Polyps versus retention cysts are seen within the bilateral maxillary sinuses. A right frontal sinus osteoma appears unchanged. The tympanomastoid cavities are clear. The calvarium is intact. There is an unchanged appearing exophytic left-sided occipital osteoma. There is evidence of bilateral cataract removal.  MRV HEAD: The superior, inferior, straight, confluence of, transverse, and sigmoid sinuses are patent. There is no evidence of venous sinus thrombosis or stenosis. Arachnoid granulations are seen within the right transverse sinus.  IMPRESSION:  MRI BRAIN: 1. No acute intracranial hemorrhage, acute ischemia, or abnormal intracranial enhancement. 2. Multiple similar-appearing patchy confluent nonspecific abnormal white matter foci of T2/FLAIR prolongation statistically favoring microvascular type changes. 3. Unchanged partially empty sella.  MRV HEAD: 1. No evidence of venous sinus thrombosis or stenosis.  --- End of Report ---  BAILEY GOMEZ MD; Attending Radiologist This document has been electronically signed. Apr 14 2025  8:22PM

## 2025-04-25 NOTE — ASSESSMENT
[FreeTextEntry1] : Impression: rule out CSF leak rule out SIH  reviewed repeat MRI/MRV of 4/14/25 in pacs/nw which appears similar to previous images  discussed recommend a CT myelogram to definitively assess for CSF leak due to persistent symptoms.  Based on review of prior MRI report the patient would have had a probable positive KATI SIH score.    PLAN: CT myelogram Dynamic - see all orders - to be done with Dr. Streeter at [facility] CT Cervical/ Thoracic/ Lumbar with contast Xray Fluoro guided spine injection epidural - Dr. Streeter RTO 1 month or when above complete to assess for CSF leak  71F here with symptoms and outside imaging findings concerning for intracranial hypotension. MRI done at [facility] based on report with an intermediate or high probability KATI score.  Repeat MRI reviewed will now plan for dynamic CT myelogram.

## 2025-04-25 NOTE — HISTORY OF PRESENT ILLNESS
[FreeTextEntry1] : 70 yo RHD female with PMH of HTN, HLD, COPD, kidney stones, diverticulosis, GERD, vitiligo and mixed connective tissue disease. Complains of headache onset 11/2024, now since 12/19/24, positional headaches worse in standing, relieved lying down following with Dr. Leyva for suspected IIH (hypotension). During work up at [facility] 12/2024, CT of the brain showed chronic ischemic changes, osteoma of the right frontal sinus with mild sinus mucosal thickening without air-fluid levels, exostosis of the left occipital bone. MRIs of the brain revealed thin subdural hematomas along the cerebral convexities measuring 1 to 2 mm in greatest diameter without significant mass effect or midline shift. There was moderate chronic microvascular changes and parenchymal volume loss. There was no mention of enhancement. MRA of the brain revealed no large vessel occlusion or aneurysm. MRA of the neck revealed no significant vascular stenosis of the extracranial carotid or vertebral arteries. She was discharged.  [facility] work up for persistent headaches, nausea, vomiting and gait unsteadiness concerning for intracranial hypotension. This was supported by the opening pressure of 10 cm of water. MR myelogram did not show any evidence of CSF leak. She underwent a follow-up MRI of the brain on February 19, 2025. That study revealed no evidence of intracranial hemorrhage. There was moderate microvascular ischemic change.  Today, patient arrives via [facility] for imaging review of MRI/MRV in pacs/nw.  Stable persistent chronic intermittent symptoms of headaches (unclear if improve or worsen with sitting or lying down), nausea, gait unsteadiness when initiating.   4/10/25: patient arrives for an initial consult reports all above history, and feels symptoms of intermittent headaches, Right ear tinnitus for years whooshing, unclear if positional headaches feels stable from sit to stand maybe slightly increased discomfort.  Still reports daily nausea taking pepto bismol, feels dizziness and imbalance slightly improved. Reviewed LP opening pressure of 10, last MRI's done at [facility] not the best quality Discussed work up for occult CSF leak - this would require a CT myelogram to assess if CSF leaking into the vein

## 2025-05-20 NOTE — PHYSICAL EXAM
[Normal Venous Pressure] : normal venous pressure [Normal S1, S2] : normal S1, S2 [No Murmur] : no murmur [Soft] : abdomen soft [Normal Gait] : normal gait [No Edema] : no edema [No Rash] : no rash [Moves all extremities] : moves all extremities [Alert and Oriented] : alert and oriented [de-identified] : Thin woman in no acute distress [de-identified] : [place] sclera [de-identified] : No JVD. [de-identified] : Increased expiratory time.  No wheezing. [de-identified] : Somewhat anxious affect

## 2025-05-20 NOTE — HISTORY OF PRESENT ILLNESS
[FreeTextEntry1] : Continues to have her symptoms of palpitations and numbness related to sleep being on her limbs. She is undergoing workup for cerebral hypoperfusion including possible CSF leak. She has not stopped smoking.  Prior: Left arm numbness when she lays down. Not using CPAP. Wakes up from a pounding heart beat. Light sensitivity and chronic nausea.  Prior: She was taken to the hospital and diagnosed with a subdural hematoma that was spontaneous, no falls are reported.  There was a an extensive workup done in the [facility] that showed normal arterial perfusion to her head and chronic white matter disease. She continues to be lightheaded particularly when she gets up and significantly nauseated.  Her blood pressure control has been variable and she was taken off verapamil and started on carvedilol but her nausea seems worse. She continues to smoke cigarettes. I reviewed her hospital stay on her phone. Prior: Left chest pressure lasted all day today. She awoke with it. Had a rapid heart beat prior. these symptoms are consistent with when she lays down on her left side. Had a nasal polyp issue.  ERICK, But not using her CPAP machine. Still smoking, But wants to quit. 15/0.7 9.11.24 , hdl 65 Review of her chest CT done for lung issues Did not report any coronary calcification. She has not been taking her statin for unclear reasons.  Prior: episodes of palpitations at night. Wakes her from sleep. feels suddenly and persists for minutes.  Has ERICK but not under treatment due to broken CPAP machine. Some brief chest pains. Still smoking. 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 aortic calcification.  Prior: still taking verapamil. Stopped famotidine due to chest pressure and hair loss. No further chest pains. Still smoking.  Prior: She underwent exercise stress with her nuclear imaging which revealed poor exercise capacity with some shortness of breath but no ischemic ECG changes or myocardial perfusion image defect.  Her ejection fraction was preserved. She has continued to smoke and has not started her amlodipine yet. She took Crestor briefly but reported joint aches and stopped it after speaking with her internist.  Prior: Dear Taran, Thank you for referring her for cardiovascular evaluation and second opinion. She is a 69-year-old longtime smoker with hypertension and recently diagnosed hyperlipidemia who was seen by another cardiologist because of an abnormal ECG. She was treated for COVID and reported feeling ill for over 3 weeks, she has episodes of left upper chest discomfort that seem to resolve with PPI and belching. She reports that she walks up to 2 miles a day without chest pain, but at a very slow pace.  She notes exertional shortness of breath after 1 flight of stairs without chest pain. She has a history of COPD and diverticulitis with significant gastrointestinal bleed.  She is followed by Dr. Magdaleno for her pulmonary disease.  Recent PFT showed mild obstructive disease with reduced diffusion capacity. Recent lab work was unremarkable save for an LDL of 134 mg/dL for which she was started on Crestor 5 mg (she never took it). She has no known history of coronary artery disease, diabetes mellitus or family history of premature coronary artery disease. She does have obstructive sleep apnea that is not treated and fatigue is a prominent symptom as well.

## 2025-05-20 NOTE — DISCUSSION/SUMMARY
[FreeTextEntry1] : She is a 71-year-old with hypertension, hyperlipidemia, active smoking and an abnormal ECG and Spontaneous subdural hematomas with chronic white matter disease and no signs of intracranial stenoses on MRA.    ECG shows sinus rhythm with Nonspecific T wave flattening throughout the precordium, Unchanged from prior Echocardiography At [facility] showed normal left ventricular systolic function. Chronic white matter disease/subdural hematomas: Workup underway for possible CSF leak. For now, continue low-dose verapamil as her blood pressure control is adequate. We discussed smoking cessation again and she is joining a webinar which will hopefully help her kick the habit. Follow-up in 4 months or if new symptoms arise. [EKG obtained to assist in diagnosis and management of assessed problem(s)] : EKG obtained to assist in diagnosis and management of assessed problem(s)

## 2025-05-20 NOTE — REVIEW OF SYSTEMS
[SOB] : shortness of breath [Dyspnea on exertion] : dyspnea during exertion [Cough] : cough [Negative] : Heme/Lymph [Leg Claudication] : no intermittent leg claudication [de-identified] : Tingling in her fingers and toes